# Patient Record
Sex: MALE | Race: WHITE
[De-identification: names, ages, dates, MRNs, and addresses within clinical notes are randomized per-mention and may not be internally consistent; named-entity substitution may affect disease eponyms.]

---

## 2023-08-03 ENCOUNTER — APPOINTMENT (OUTPATIENT)
Dept: ORTHOPEDIC SURGERY | Facility: CLINIC | Age: 18
End: 2023-08-03
Payer: COMMERCIAL

## 2023-08-03 DIAGNOSIS — M23.91 UNSPECIFIED INTERNAL DERANGEMENT OF RIGHT KNEE: ICD-10-CM

## 2023-08-03 DIAGNOSIS — M79.18 MYALGIA, OTHER SITE: ICD-10-CM

## 2023-08-03 DIAGNOSIS — Z78.9 OTHER SPECIFIED HEALTH STATUS: ICD-10-CM

## 2023-08-03 PROBLEM — Z00.00 ENCOUNTER FOR PREVENTIVE HEALTH EXAMINATION: Status: ACTIVE | Noted: 2023-08-03

## 2023-08-03 PROCEDURE — 73564 X-RAY EXAM KNEE 4 OR MORE: CPT | Mod: RT

## 2023-08-03 PROCEDURE — 99204 OFFICE O/P NEW MOD 45 MIN: CPT

## 2023-08-03 RX ORDER — DICLOFENAC SODIUM 75 MG/1
75 TABLET, DELAYED RELEASE ORAL TWICE DAILY
Qty: 60 | Refills: 0 | Status: ACTIVE | COMMUNITY
Start: 2023-08-03 | End: 1900-01-01

## 2023-08-03 NOTE — ASSESSMENT
[FreeTextEntry1] : Right X-Ray Examination of the KNEE (4 views): there are no fractures, subluxations or dislocations.  Due to the patients mechanical symptoms along with medial joint line pain, effusion, and pos jayme test on exam we will get an mri to eval for medial meniscus tear  - The patient was advised of the diagnosis.  The natural history of the pathology was explained to the patient in layman's terms.  Several different treatment options were discussed and explained including the risks and benefits of both surgical and non-surgical treatments. - The patient was advised to apply ice (wrapped in a towel or protective covering) to the area daily (20 minutes at a time, 2-4X/day). - Diclofenac rx - Patient was given a prescription for an anti-inflammatory medication.  They will take it for the next week and then on an as needed basis, as long as there are no medical contra-indications.  Patient is counseled on possible GI, renal, and cardiovascular side effects. - The patient was advised to modify their activities. - f/u after mri

## 2023-08-03 NOTE — IMAGING
[de-identified] :  RIGHT KNEE Inspection:  mild effusion Palpation: medial joint line tenderness  Knee Range of Motion:  0-130  Strength: 5/5 Quadriceps strength, 5/5 Hamstring strength Neurological: light touch is intact throughout Ligament Stability and Special Tests:  McMurrays: Positive Lachman: neg Pivot Shift: neg Posterior Drawer: neg Valgus: neg Varus: neg Patella Apprehension: neg Patella Maltracking: neg

## 2023-08-03 NOTE — HISTORY OF PRESENT ILLNESS
[de-identified] : 17 year old male  (RHD, [facility], works out)  right knee 6/2023. pain worsened 7/28/23 after twisted The pain is located  posterior, medial, deep The pain is associated with  vcatching and locking Worse with activity and better at rest. Has tried tylenol, ibuprofen, ice

## 2023-08-07 ENCOUNTER — APPOINTMENT (OUTPATIENT)
Dept: MRI IMAGING | Facility: CLINIC | Age: 18
End: 2023-08-07

## 2023-08-07 ENCOUNTER — APPOINTMENT (OUTPATIENT)
Dept: MRI IMAGING | Facility: CLINIC | Age: 18
End: 2023-08-07
Payer: COMMERCIAL

## 2023-08-07 PROCEDURE — 73721 MRI JNT OF LWR EXTRE W/O DYE: CPT | Mod: RT

## 2023-08-09 ENCOUNTER — APPOINTMENT (OUTPATIENT)
Dept: ORTHOPEDIC SURGERY | Facility: CLINIC | Age: 18
End: 2023-08-09

## 2023-09-19 ENCOUNTER — APPOINTMENT (OUTPATIENT)
Dept: ORTHOPEDIC SURGERY | Facility: CLINIC | Age: 18
End: 2023-09-19
Payer: COMMERCIAL

## 2023-09-19 PROCEDURE — 99214 OFFICE O/P EST MOD 30 MIN: CPT | Mod: 95

## 2023-12-27 ENCOUNTER — APPOINTMENT (OUTPATIENT)
Dept: ORTHOPEDIC SURGERY | Facility: CLINIC | Age: 18
End: 2023-12-27
Payer: COMMERCIAL

## 2023-12-27 DIAGNOSIS — S83.511A SPRAIN OF ANTERIOR CRUCIATE LIGAMENT OF RIGHT KNEE, INITIAL ENCOUNTER: ICD-10-CM

## 2023-12-27 DIAGNOSIS — M23.306 OTHER MENISCUS DERANGEMENTS, UNSPECIFIED MENISCUS, RIGHT KNEE: ICD-10-CM

## 2023-12-27 DIAGNOSIS — M67.51 PLICA SYNDROME, RIGHT KNEE: ICD-10-CM

## 2023-12-27 PROCEDURE — 99214 OFFICE O/P EST MOD 30 MIN: CPT

## 2023-12-27 NOTE — IMAGING
[de-identified] : RIGHT KNEE Inspection:  mild effusion Palpation: medial joint line tenderness  Knee Range of Motion:  0-130  Strength: 5/5 Quadriceps strength, 5/5 Hamstring strength Neurological: light touch is intact throughout Ligament Stability and Special Tests:  McMurrays: Positive Lachman: neg Pivot Shift: pos Posterior Drawer: neg Valgus: neg Varus: neg Patella Apprehension: neg Patella Maltracking: neg

## 2023-12-27 NOTE — HISTORY OF PRESENT ILLNESS
[de-identified] : 18 year old male  (RHD, [facility], works out, will be applying for [employer] )  right knee 6/2023. pain worsened 7/28/23 after twisted The pain is located  posterior, medial, deep The pain is associated with  catching and buckling Worse with activity and better at rest. 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

## 2023-12-27 NOTE — ASSESSMENT
[FreeTextEntry1] : mri right knee 8/7/23 - deg MMT, plica, acl mucoid change with interstial tearing and ganglia   - The patient was advised of the diagnosis. The natural history of the pathology was explained to the patient in layman's terms. Several different treatment options were discussed and explained including the risks and benefits of both surgical and non-surgical treatments. All questions and concerns were answered. - We will continue conservative treatment with PT, icing, and anti-inflammatory medications. - The patient was provided with a prescription for Physical Therapy. - if cont symtpoms then updated mri to eval progression of acl tear and MMT - fu when back from school for repeat mri   (discussed if cont instability consider surgery)

## 2025-05-29 ENCOUNTER — APPOINTMENT (OUTPATIENT)
Dept: ORTHOPEDIC SURGERY | Facility: CLINIC | Age: 20
End: 2025-05-29
Payer: COMMERCIAL

## 2025-05-29 ENCOUNTER — APPOINTMENT (OUTPATIENT)
Dept: MRI IMAGING | Facility: CLINIC | Age: 20
End: 2025-05-29
Payer: COMMERCIAL

## 2025-05-29 VITALS — HEIGHT: 69 IN | BODY MASS INDEX: 26.66 KG/M2 | WEIGHT: 180 LBS

## 2025-05-29 PROCEDURE — 99214 OFFICE O/P EST MOD 30 MIN: CPT

## 2025-05-29 PROCEDURE — 73564 X-RAY EXAM KNEE 4 OR MORE: CPT | Mod: RT

## 2025-05-29 PROCEDURE — 73721 MRI JNT OF LWR EXTRE W/O DYE: CPT | Mod: RT

## 2025-05-29 RX ORDER — DICLOFENAC SODIUM 75 MG/1
75 TABLET, DELAYED RELEASE ORAL TWICE DAILY
Qty: 60 | Refills: 0 | Status: ACTIVE | COMMUNITY
Start: 2025-05-29 | End: 1900-01-01

## 2025-06-02 ENCOUNTER — APPOINTMENT (OUTPATIENT)
Dept: ORTHOPEDIC SURGERY | Facility: CLINIC | Age: 20
End: 2025-06-02
Payer: COMMERCIAL

## 2025-06-02 DIAGNOSIS — S83.511D SPRAIN OF ANTERIOR CRUCIATE LIGAMENT OF RIGHT KNEE, SUBSEQUENT ENCOUNTER: ICD-10-CM

## 2025-06-02 DIAGNOSIS — M67.51 PLICA SYNDROME, RIGHT KNEE: ICD-10-CM

## 2025-06-02 DIAGNOSIS — S83.511A SPRAIN OF ANTERIOR CRUCIATE LIGAMENT OF RIGHT KNEE, INITIAL ENCOUNTER: ICD-10-CM

## 2025-06-02 DIAGNOSIS — M22.2X1 PATELLOFEMORAL DISORDERS, RIGHT KNEE: ICD-10-CM

## 2025-06-02 PROCEDURE — 99214 OFFICE O/P EST MOD 30 MIN: CPT

## 2025-08-14 ENCOUNTER — APPOINTMENT (OUTPATIENT)
Dept: ORTHOPEDIC SURGERY | Facility: CLINIC | Age: 20
End: 2025-08-14
Payer: COMMERCIAL

## 2025-08-14 DIAGNOSIS — M22.2X1 PATELLOFEMORAL DISORDERS, RIGHT KNEE: ICD-10-CM

## 2025-08-14 DIAGNOSIS — S83.511A SPRAIN OF ANTERIOR CRUCIATE LIGAMENT OF RIGHT KNEE, INITIAL ENCOUNTER: ICD-10-CM

## 2025-08-14 DIAGNOSIS — M67.51 PLICA SYNDROME, RIGHT KNEE: ICD-10-CM

## 2025-08-14 PROCEDURE — 99214 OFFICE O/P EST MOD 30 MIN: CPT

## 2025-08-14 RX ORDER — DICLOFENAC SODIUM 75 MG/1
75 TABLET, DELAYED RELEASE ORAL TWICE DAILY
Qty: 60 | Refills: 1 | Status: ACTIVE | COMMUNITY
Start: 2025-08-14 | End: 1900-01-01

## 2025-08-20 ENCOUNTER — APPOINTMENT (OUTPATIENT)
Dept: MRI IMAGING | Facility: CLINIC | Age: 20
End: 2025-08-20
Payer: COMMERCIAL

## 2025-08-20 PROCEDURE — 73721 MRI JNT OF LWR EXTRE W/O DYE: CPT | Mod: RT

## 2025-08-28 ENCOUNTER — APPOINTMENT (OUTPATIENT)
Dept: ORTHOPEDIC SURGERY | Facility: CLINIC | Age: 20
End: 2025-08-28
Payer: COMMERCIAL

## 2025-08-28 DIAGNOSIS — M22.2X1 PATELLOFEMORAL DISORDERS, RIGHT KNEE: ICD-10-CM

## 2025-08-28 DIAGNOSIS — M67.51 PLICA SYNDROME, RIGHT KNEE: ICD-10-CM

## 2025-08-28 PROCEDURE — 99214 OFFICE O/P EST MOD 30 MIN: CPT
